# Patient Record
Sex: FEMALE | Race: WHITE | NOT HISPANIC OR LATINO | Employment: UNEMPLOYED | URBAN - METROPOLITAN AREA
[De-identification: names, ages, dates, MRNs, and addresses within clinical notes are randomized per-mention and may not be internally consistent; named-entity substitution may affect disease eponyms.]

---

## 2017-01-14 ENCOUNTER — HOSPITAL ENCOUNTER (EMERGENCY)
Facility: HOSPITAL | Age: 22
Discharge: HOME | End: 2017-01-15
Attending: EMERGENCY MEDICINE
Payer: MEDICAID

## 2017-01-14 VITALS
WEIGHT: 120 LBS | BODY MASS INDEX: 19.29 KG/M2 | HEART RATE: 97 BPM | TEMPERATURE: 98 F | DIASTOLIC BLOOD PRESSURE: 68 MMHG | OXYGEN SATURATION: 97 % | SYSTOLIC BLOOD PRESSURE: 101 MMHG | HEIGHT: 66 IN | RESPIRATION RATE: 16 BRPM

## 2017-01-14 DIAGNOSIS — S91.319A LACERATION OF FOOT: ICD-10-CM

## 2017-01-14 LAB
B-HCG UR QL: NEGATIVE
CTP QC/QA: YES

## 2017-01-14 PROCEDURE — 90715 TDAP VACCINE 7 YRS/> IM: CPT | Performed by: PHYSICIAN ASSISTANT

## 2017-01-14 PROCEDURE — 99285 EMERGENCY DEPT VISIT HI MDM: CPT | Mod: 25

## 2017-01-14 PROCEDURE — 12002 RPR S/N/AX/GEN/TRNK2.6-7.5CM: CPT

## 2017-01-14 PROCEDURE — 25000003 PHARM REV CODE 250: Performed by: PHYSICIAN ASSISTANT

## 2017-01-14 PROCEDURE — 81025 URINE PREGNANCY TEST: CPT | Performed by: PHYSICIAN ASSISTANT

## 2017-01-14 PROCEDURE — 90471 IMMUNIZATION ADMIN: CPT | Performed by: PHYSICIAN ASSISTANT

## 2017-01-14 PROCEDURE — 63600175 PHARM REV CODE 636 W HCPCS: Performed by: PHYSICIAN ASSISTANT

## 2017-01-14 RX ORDER — LIDOCAINE HYDROCHLORIDE 10 MG/ML
10 INJECTION INFILTRATION; PERINEURAL
Status: COMPLETED | OUTPATIENT
Start: 2017-01-14 | End: 2017-01-14

## 2017-01-14 RX ADMIN — LIDOCAINE HYDROCHLORIDE 10 ML: 10 INJECTION, SOLUTION INFILTRATION; PERINEURAL at 11:01

## 2017-01-14 RX ADMIN — CLOSTRIDIUM TETANI TOXOID ANTIGEN (FORMALDEHYDE INACTIVATED), CORYNEBACTERIUM DIPHTHERIAE TOXOID ANTIGEN (FORMALDEHYDE INACTIVATED), BORDETELLA PERTUSSIS TOXOID ANTIGEN (GLUTARALDEHYDE INACTIVATED), BORDETELLA PERTUSSIS FILAMENTOUS HEMAGGLUTININ ANTIGEN (FORMALDEHYDE INACTIVATED), BORDETELLA PERTUSSIS PERTACTIN ANTIGEN, AND BORDETELLA PERTUSSIS FIMBRIAE 2/3 ANTIGEN 0.5 ML: 5; 2; 2.5; 5; 3; 5 INJECTION, SUSPENSION INTRAMUSCULAR at 11:01

## 2017-01-14 NOTE — ED AVS SNAPSHOT
[facility]  [place] [id number]               Vianey Sepulveda   2017 10:40 PM   ED    Description:  Female : 1995   Department:  [facility]           Your Care was Coordinated By:     Provider Role From To    Jaycee Connolly MD Attending Provider [phone] --    MERRY Guerrier Physician Assistant 17 [phone] --      Reason for Visit     Laceration           Diagnoses this Visit        Comments    Laceration of foot           ED Disposition     None           To Do List           Follow-up Information     Please follow up.    Why:  Return to ED in 10 days for suture removal.       These Medications        Disp Refills Start End    doxycycline (VIBRAMYCIN) 100 MG Cap 20 capsule 0 1/15/2017 2017    Take 1 capsule (100 mg total) by mouth 2 (two) times daily. - Oral      [facility]     [facility] On Call Nurse Care Line -  Assistance  Registered nurses in the [facility] provide clinical advisement, health education, appointment booking, and other advisory services.  Call for this free service at [phone].             Medications           START taking these NEW medications        Refills    doxycycline (VIBRAMYCIN) 100 MG Cap 0    Sig: Take 1 capsule (100 mg total) by mouth 2 (two) times daily.    Class: Print    Route: Oral      These medications were administered today        Dose Freq    lidocaine HCL 10 mg/ml (1%) injection 10 mL 10 mL ED 1 Time    Sig: 10 mLs by Infiltration route ED 1 Time.    Class: Normal    Route: Infiltration    Cosign for Ordering: Required by Jaycee Connolly MD    Tdap vaccine injection 0.5 mL 0.5 mL ED 1 Time    Sig: Inject 0.5 mLs into the muscle ED 1 Time.    Class: Normal    Route: Intramuscular    Cosign for Ordering: Required by Jaycee Connolly MD           Verify that the below list of medications is an accurate representation of the medications you are  "currently taking.  If none reported, the list may be blank. If incorrect, please contact your healthcare provider. Carry this list with you in case of emergency.           Current Medications     doxycycline (VIBRAMYCIN) 100 MG Cap Take 1 capsule (100 mg total) by mouth 2 (two) times daily.           Clinical Reference Information           Your Vitals Were     BP Pulse Temp Resp Height Weight    101/68 (BP Location: Right arm, Patient Position: Sitting) 97 98.4 °F (36.9 °C) (Oral) 16 5' 6" (1.676 m) 54.4 kg (120 lb)    SpO2 BMI             97% 19.37 kg/m2         Allergies as of 1/15/2017     No Known Allergies      Immunizations Administered on Date of Encounter - 1/15/2017     None      ED Micro, Lab, POCT     Start Ordered       Status Ordering Provider    01/14/17 2251 01/14/17 2250  POCT urine pregnancy  Once      Final result       ED Imaging Orders     Start Ordered       Status Ordering Provider    01/14/17 2251 01/14/17 2250  X-Ray Foot Complete Left  1 time imaging      Final result         Discharge Instructions         Foot Laceration: All Closures  A laceration is a cut through the skin. You have a cut on your foot. Deep cuts may require stitches (sutures). Minor cuts may be treated with surgical tape closures or skin glue.  X-rays may be done if something may have entered the skin through the cut. Your may also need a tetanus shot. This is given if you are not up to date on this vaccination and the object that caused the cut may lead to tetanus.    Home care  · Your healthcare provider may prescribe an antibiotic. This is to help prevent infection. Follow all instructions for taking this medicine. Take the medicine every day until it is gone or you are told to stop. You should not have any left over.  · The healthcare provider may prescribe medicines for pain. Follow instructions for taking them.  · Follow the healthcare providers instructions on how to care for the cut.  · You may be given " instructions for keeping weight off of the area to allow the injury to heal.   · Follow the healthcare providers instructions on how to care for the cut.   · Keep the wound clean and dry. Do not get the wound wet until you are told it is okay to do so. If the area gets wet, gently pat it dry with a clean cloth. Replace the wet bandage with a dry one.  · To help prevent infection, wash your hands with soap and water before and after caring for the wound.   · Caring for stitches or staples: Once you no longer need to keep them dry, clean the wound daily. First, remove the bandage. Then wash the area gently with soap and warm water, or as directed by the health care provider. Use a wet cotton swab to loosen and remove any blood or crust that forms. After cleaning, apply a thin layer of antibiotic ointment if advised. Then put on a new bandage unless you are told not to.  · Caring for skin glue: Dont put apply liquid, ointment, or cream on the wound while the glue is in place. Avoid activities that cause heavy sweating. Protect the wound from sunlight. Do not scratch, rub, or pick at the adhesive film. Do not place tape directly over the film. The glue should peel off within 5 to 10 days.   · Caring for surgical tape: Keep the area dry. If it gets wet, blot it dry with a clean towel. Surgical tape usually falls off within 7 to 10 days. If it has not fallen off after 10 days, you can take it off yourself. Put mineral oil or petroleum jelly on a cotton ball and gently rub the tape until it is removed.  · Once you can get the wound wet, you may shower as usual but do not soak the wound in water (no tub baths or swimming)  · Even with proper treatment, a wound infection may sometimes occur. Check the wound daily for signs of infection listed below.  Follow-up care  Follow up with your healthcare provider as advised. Return to have stitches or staples removed as directed.  When to seek medical advice  Call your healthcare  provider right away if any of these occur:  · Wound bleeding not controlled by direct pressure  · Signs of infection, including increasing pain in the wound, increasing wound redness or swelling, or pus or bad odor coming from the wound  · Fever of 100.4°F (38.ºC) or as directed by your healthcare provider  · Stitches or staples come apart or fall out or surgical tape falls off before 7 days  · Wound edges re-open  · Wound changes colors  · Numbness or weakness in the affected foot   · Decreased movement of the foot  © 4239-6105 [employer] [address] [place] [address]. All rights reserved. This information is not intended as a substitute for professional medical care. Always follow your healthcare professional's instructions.          [facility] Sign-Up     Activating your [facility] account is as easy as 1-2-3!     1) Visit [email or web address], select Sign Up Now, enter this activation code and your date of birth, then select Next.  [email or web address]  Expires: 3/1/2017 12:07 AM      2) Create a username and password to use when you visit [facility] in the future and select a security question in case you lose your password and select Next.    3) Enter your e-mail address and click Sign Up!    Additional Information  If you have questions, please e-mail [email or web address] or call [phone] to talk to our [facility] staff. Remember, [facility] is NOT to be used for urgent needs. For medical emergencies, dial 911.          [facility] complies with applicable Federal civil rights laws and does not discriminate on the basis of race, color, national origin, age, disability, or sex.        Language Assistance Services     ATTENTION: Language assistance services are available, free of charge. Please call [phone].      ATENCIÓN: Si radhala sanam, tiene a ness disposición servicios gratuitos de asistencia lingüística. Llame al [phone].     CHÚ Ý: N?u b?n nói Ti?ng Vi?t,  có các d?ch v? h? tr? dora ng? mi?n phí dành cho b?n. G?i s? [phone].

## 2017-01-15 RX ORDER — DOXYCYCLINE 100 MG/1
100 CAPSULE ORAL 2 TIMES DAILY
Qty: 20 CAPSULE | Refills: 0 | Status: SHIPPED | OUTPATIENT
Start: 2017-01-15 | End: 2017-01-25

## 2017-01-15 NOTE — ED TRIAGE NOTES
Pt presents to ed with complaints of two lacerations to rt foot. 3cm to big toe and 3 to side of left foot. Bleeding controlled.

## 2017-01-15 NOTE — DISCHARGE INSTRUCTIONS
Foot Laceration: All Closures  A laceration is a cut through the skin. You have a cut on your foot. Deep cuts may require stitches (sutures). Minor cuts may be treated with surgical tape closures or skin glue.  X-rays may be done if something may have entered the skin through the cut. Your may also need a tetanus shot. This is given if you are not up to date on this vaccination and the object that caused the cut may lead to tetanus.    Home care  · Your healthcare provider may prescribe an antibiotic. This is to help prevent infection. Follow all instructions for taking this medicine. Take the medicine every day until it is gone or you are told to stop. You should not have any left over.  · The healthcare provider may prescribe medicines for pain. Follow instructions for taking them.  · Follow the healthcare providers instructions on how to care for the cut.  · You may be given instructions for keeping weight off of the area to allow the injury to heal.   · Follow the healthcare providers instructions on how to care for the cut.   · Keep the wound clean and dry. Do not get the wound wet until you are told it is okay to do so. If the area gets wet, gently pat it dry with a clean cloth. Replace the wet bandage with a dry one.  · To help prevent infection, wash your hands with soap and water before and after caring for the wound.   · Caring for stitches or staples: Once you no longer need to keep them dry, clean the wound daily. First, remove the bandage. Then wash the area gently with soap and warm water, or as directed by the health care provider. Use a wet cotton swab to loosen and remove any blood or crust that forms. After cleaning, apply a thin layer of antibiotic ointment if advised. Then put on a new bandage unless you are told not to.  · Caring for skin glue: Dont put apply liquid, ointment, or cream on the wound while the glue is in place. Avoid activities that cause heavy sweating. Protect the wound  from sunlight. Do not scratch, rub, or pick at the adhesive film. Do not place tape directly over the film. The glue should peel off within 5 to 10 days.   · Caring for surgical tape: Keep the area dry. If it gets wet, blot it dry with a clean towel. Surgical tape usually falls off within 7 to 10 days. If it has not fallen off after 10 days, you can take it off yourself. Put mineral oil or petroleum jelly on a cotton ball and gently rub the tape until it is removed.  · Once you can get the wound wet, you may shower as usual but do not soak the wound in water (no tub baths or swimming)  · Even with proper treatment, a wound infection may sometimes occur. Check the wound daily for signs of infection listed below.  Follow-up care  Follow up with your healthcare provider as advised. Return to have stitches or staples removed as directed.  When to seek medical advice  Call your healthcare provider right away if any of these occur:  · Wound bleeding not controlled by direct pressure  · Signs of infection, including increasing pain in the wound, increasing wound redness or swelling, or pus or bad odor coming from the wound  · Fever of 100.4°F (38.ºC) or as directed by your healthcare provider  · Stitches or staples come apart or fall out or surgical tape falls off before 7 days  · Wound edges re-open  · Wound changes colors  · Numbness or weakness in the affected foot   · Decreased movement of the foot  © 4363-1837 [employer] [address] [place] [address], [place] [address]. All rights reserved. This information is not intended as a substitute for professional medical care. Always follow your healthcare professional's instructions.

## 2017-01-15 NOTE — ED PROVIDER NOTES
Encounter Date: 1/14/2017    SCRIBE #1 NOTE: I, Jacquelyn Caldwell, am scribing for, and in the presence of,  Thuy Schaeffer PA-C. I have scribed the following portions of the note - Other sections scribed: HPI/ROS.       History     Chief Complaint   Patient presents with    Laceration     Pt states she went swimming today and cut her left foot on a oyster shell.     Review of patient's allergies indicates:  No Known Allergies  HPI Comments: CC: Laceration    HPI: This 20 yo F who has no pertinent PMHx presents to the ED for evaluation of laceration to the posterior left greater toe and left lateral foot with associated left foot pain secondary to cutting her foot on an oyster shell today. The pain is moderate and constant. The [place] Tetanus is not up-to-date. The pt denies fever, chills, N/V/D, and any other associated symptoms. No alleviating or exacerbating factors reported.     The history is provided by the patient. No  was used.     History reviewed. No pertinent past medical history.  No past medical history pertinent negatives.  History reviewed. No pertinent past surgical history.  History reviewed. No pertinent family history.  Social History   Substance Use Topics    Smoking status: Never Smoker    Smokeless tobacco: None    Alcohol use No     Review of Systems   Constitutional: Negative for chills and fever.   HENT: Negative for congestion and sore throat.    Eyes: Negative for visual disturbance.   Respiratory: Negative for cough and shortness of breath.    Gastrointestinal: Negative for abdominal pain, diarrhea, nausea and vomiting.   Genitourinary: Negative for dysuria and frequency.   Musculoskeletal: Negative for myalgias.        (+) L foot pain   Skin: Positive for wound.        (+) laceration, to the posterior L greater toe and L lateral foot   Neurological: Negative for headaches.       Physical Exam   Initial Vitals   BP Pulse  Resp Temp SpO2   01/14/17 2238 01/14/17 2238 01/14/17 2238 01/14/17 2238 01/14/17 2238   101/68 97 16 98.4 °F (36.9 °C) 97 %     Physical Exam    Constitutional: She appears well-nourished.   Eyes: Conjunctivae and EOM are normal. Pupils are equal, round, and reactive to light.   Neck: Normal range of motion. Neck supple.   Cardiovascular: Normal rate, regular rhythm, normal heart sounds and intact distal pulses.   Pulmonary/Chest: Breath sounds normal. No respiratory distress.   Musculoskeletal: Normal range of motion.   Neurological: She is alert and oriented to person, place, and time. She has normal strength.   Skin: Skin is warm.   3 cm laceration to plantar aspect of left great toe.  Patient also has a 2 cm well approximated laceration to the lateral aspect of left foot   Psychiatric: She has a normal mood and affect.         ED Course   Lac Repair  Date/Time: 1/15/2017 1:04 AM  Performed by: PANDA MENDES  Authorized by: VITA JENKINS   Location: Plantar aspect of the left great toe.  Laceration length: 3 cm  Foreign bodies: no foreign bodies  Tendon involvement: none  Nerve involvement: none  Vascular damage: no  Anesthesia: local infiltration    Anesthesia:  Anesthesia: local infiltration  Anesthetic total: 4 mL  Patient sedated: no  Preparation: Patient was prepped and draped in the usual sterile fashion.  Irrigation solution: saline  Amount of cleaning: standard  Debridement: none  Skin closure: Ethilon  Number of sutures: 7  Technique: simple  Approximation: close  Approximation difficulty: simple  Dressing: 4x4 sterile gauze  Patient tolerance: Patient tolerated the procedure well with no immediate complications        Labs Reviewed   POCT URINE PREGNANCY             Medical Decision Making:   Initial Assessment:   21-year-old female presents complaining of a laceration to bottom of left foot today.  Patient states she cut it on oyster shells.  Patient has a 3 cm laceration to plantar aspect of  left great toe.  Wound repaired with sutures.  Patient also has a 2 cm well approximated laceration to the lateral aspect of left foot, wound does not require sutures.  Left foot x-rays negative for fracture or foreign body.  Tetanus given in the ED.  Patient instructed to keep wound clean and dry.  I will discharge patient home with doxycycline.  Patient instructed to return to ED if she develops any signs of infection.  Patient is stable for discharge.            Scribe Attestation:   Scribe #1: I performed the above scribed service and the documentation accurately describes the services I performed. I attest to the accuracy of the note.    Attending Attestation:           Physician Attestation for Scribe:  Physician Attestation Statement for Scribe #1: I, Mariella Schaeffer PA-C, reviewed documentation, as scribed by Jacquelyn Caldwell in my presence, and it is both accurate and complete.                 ED Course     Clinical Impression:   The encounter diagnosis was Laceration of foot.          MERRY Guerrier  01/15/17 0106

## 2017-04-28 ENCOUNTER — HOSPITAL ENCOUNTER (EMERGENCY)
Facility: HOSPITAL | Age: 22
Discharge: HOME | End: 2017-04-28
Attending: EMERGENCY MEDICINE
Payer: MEDICAID

## 2017-04-28 VITALS
SYSTOLIC BLOOD PRESSURE: 114 MMHG | BODY MASS INDEX: 20.25 KG/M2 | TEMPERATURE: 98 F | RESPIRATION RATE: 18 BRPM | WEIGHT: 126 LBS | DIASTOLIC BLOOD PRESSURE: 65 MMHG | OXYGEN SATURATION: 98 % | HEART RATE: 97 BPM | HEIGHT: 66 IN

## 2017-04-28 DIAGNOSIS — Z32.02 PREGNANCY TEST NEGATIVE: Primary | ICD-10-CM

## 2017-04-28 LAB
B-HCG UR QL: NEGATIVE
CTP QC/QA: YES

## 2017-04-28 PROCEDURE — 99283 EMERGENCY DEPT VISIT LOW MDM: CPT

## 2017-04-28 PROCEDURE — 81025 URINE PREGNANCY TEST: CPT | Performed by: EMERGENCY MEDICINE

## 2017-04-28 NOTE — ED PROVIDER NOTES
"Encounter Date: 4/28/2017    SCRIBE #1 NOTE: I, Orquidealesly Monzon, am scribing for, and in the presence of,  Kyra Chavez NP. I have scribed the following portions of the note - Other sections scribed: HPI and ROS.       History     Chief Complaint   Patient presents with    Possible Pregnancy     States she needs to get tested to see if she is pregnant     Review of patient's allergies indicates:  No Known Allergies  HPI Comments: CC: Possible Pregnancy    HPI: This 21 y.o. Female, accompanied by significant other, with no medical history presents to the ED for evaluation of a possible pregnancy. Per significant other, pt recently took multiple urine pregnancy test all with negative results. He states that pt also visited a "pregnancy testing clinic" where she was informed that she needed to be evaluated by her PCP or in a hospital to have a blood test preformed. Pt states that she experienced her last menstrual cycle on 3/26/17, and adds that she experienced vaginal spotting 3x weeks ago (lasted 3x days). Pt notes that she would like to have a pregnancy test preformed in the ED. No other associated symptoms.         The history is provided by the patient and a significant other. No  was used.     History reviewed. No pertinent past medical history.  History reviewed. No pertinent surgical history.  Family History   Problem Relation Age of Onset    Breast cancer Neg Hx     Colon cancer Neg Hx     Ovarian cancer Neg Hx      Social History   Substance Use Topics    Smoking status: Current Every Day Smoker     Types: Cigarettes    Smokeless tobacco: None    Alcohol use Yes      Comment: rarely     Review of Systems   Constitutional: Negative for fever.        (+) possible pregnancy   HENT: Negative for sore throat.    Respiratory: Negative for shortness of breath.    Cardiovascular: Negative for chest pain.   Gastrointestinal: Negative for nausea.   Genitourinary: Negative for dysuria. "   Musculoskeletal: Negative for back pain.   Skin: Negative for rash.   Neurological: Negative for weakness.       Physical Exam   Initial Vitals   BP Pulse Resp Temp SpO2   04/28/17 1551 04/28/17 1551 04/28/17 1551 04/28/17 1551 04/28/17 1551   114/65 97 18 98.3 °F (36.8 °C) 98 %     Physical Exam    Nursing note and vitals reviewed.  Constitutional: She appears well-developed and well-nourished.   HENT:   Head: Normocephalic.   Eyes: Conjunctivae are normal.   Neck: Normal range of motion. Neck supple.   Musculoskeletal: Normal range of motion.   Neurological: She is alert and oriented to person, place, and time.   Skin: Skin is warm and dry.         ED Course   Procedures  Labs Reviewed   POCT URINE PREGNANCY             Medical Decision Making:   Initial Assessment:   21-year-old female presents for pregnancy test.  Differential Diagnosis:   Fear complaint  Positive pregnancy test  Negative pregnancy test  ED Management:  Patient and significant other presents requesting a serum hCG test being she has taken multiple pregnancy tests at home and they are negative.  Her pregnancy test was negative upon presentation to the ER. Patient is having no abdominal pain, no vaginal discharge, no other concerns.  At this time I do not believe patient is pregnant.  She has been encouraged to repeat pregnancy test at another 2-5 days if she is still concerned.  Patient reports that her significant other are attempting to become pregnant and that they were very hopeful it would be positive.    Case discussed with attending who agrees with assessment and plan.               Scribe Attestation:   Scribe #1: I performed the above scribed service and the documentation accurately describes the services I performed. I attest to the accuracy of the note.    Attending Attestation:     Physician Attestation Statement for NP/PA:   I discussed this assessment and plan of this patient with the NP/PA, but I did not personally examine the  patient. The face to face encounter was performed by the NP/PA.    Other NP/PA Attestation Additions:      Medical Decision Making: Agree with assessment and plan.       Physician Attestation for Scribe:  Physician Attestation Statement for Scribe #1: I, Kyra Chavez NP, reviewed documentation, as scribed by Orquidea Monzon in my presence, and it is both accurate and complete.                 ED Course     Clinical Impression:   The encounter diagnosis was Pregnancy test negative.    Disposition:   Disposition: Discharged  Condition: Stable       Kyra Chavez NP  04/28/17 8220       Marek Galeana MD  04/28/17 5904

## 2017-04-28 NOTE — ED TRIAGE NOTES
Reports home pregnancy test  Was negative. Requesting serum hcg. C/o nausea, HA, breast changes, increased frequency in urination, 10lb wt. Gain in 3 weeks.

## 2017-04-28 NOTE — ED AVS SNAPSHOT
[facility]  [address] [place] [address]               Vianey Sepulveda   2017  4:15 PM   ED    Description:  Female : 1995   Department:  [facility]           Your Care was Coordinated By:     Provider Role From To    Marek Galeana MD Attending Provider 17 [id number] --    Kyra Chavez NP Nurse Practitioner 17 [id number] --      Reason for Visit     Possible Pregnancy           Diagnoses this Visit        Comments    Pregnancy test negative    -  Primary       ED Disposition     None           To Do List           Follow-up Information     Schedule an appointment as soon as possible for a visit with Elenita Olvera MD.    Specialty:  Family Medicine    Contact information:    [address]  [place] [id number] [phone]          Follow up with [facility].    Specialty:  Emergency Medicine    Why:  If symptoms worsen or any other concerns    Contact information:    [address] [place] [id number] [phone]      [facility]     [facility] Nurse Care Line -  Assistance  Unless otherwise directed by your provider, please contact [facility], our nurse care line that is available for  assistance.     Registered nurses in the [facility] provide: appointment scheduling, clinical advisement, health education, and other advisory services.  Call: [phone] (toll free)               Medications           Message regarding Medications     Verify the changes and/or additions to your medication regime listed below are the same as discussed with your clinician today.  If any of these changes or additions are incorrect, please notify your healthcare provider.             Verify that the below list of medications is an accurate representation of the medications you are currently taking.  If none reported, the list may be blank. If incorrect, please contact  "your healthcare provider. Carry this list with you in case of emergency.           Current Medications     norgestimate-ethinyl estradiol (ORTHO-CYCLEN) 0.25-35 mg-mcg per tablet Take 1 tablet by mouth once daily.           Clinical Reference Information           Your Vitals Were     BP Pulse Temp Resp Height Weight    114/65 97 98.3 °F (36.8 °C) 18 5' 6" (1.676 m) 57.2 kg (126 lb)    Last Period SpO2 BMI          03/26/2017 98% 20.34 kg/m2        Allergies as of 4/28/2017     No Known Allergies      Immunizations Administered on Date of Encounter - 4/28/2017     None      ED Micro, Lab, POCT     Start Ordered       Status Ordering Provider    04/28/17 1553 04/28/17 1553  POCT urine pregnancy  Once      Final result       ED Imaging Orders     None      Discharge References/Attachments     HCG (URINE) (ENGLISH)      [facility] Sign-Up     Activating your [facility] account is as easy as 1-2-3!     1) Visit [email or web address], select Sign Up Now, enter this activation code and your date of birth, then select Next.  [email or web address]  Expires: 6/12/2017  4:36 PM      2) Create a username and password to use when you visit [facility] in the future and select a security question in case you lose your password and select Next.    3) Enter your e-mail address and click Sign Up!    Additional Information  If you have questions, please e-mail [email or web address] or call [phone] to talk to our [facility] staff. Remember, [facility] is NOT to be used for urgent needs. For medical emergencies, dial 911.         Smoking Cessation     If you would like to quit smoking:   You may be eligible for free services if you are a [place] resident and started smoking cigarettes before September 1, 1988.  Call the Smoking Cessation Trust (SCT) toll free at (451) 448-9581 or (363) 979-0933.   Call 6-800-QUIT-NOW if you do not meet the above criteria.   Contact us via email: [email or web address]   View our website for more " information: [email or web address]         [facility] complies with applicable Federal civil rights laws and does not discriminate on the basis of race, color, national origin, age, disability, or sex.        Language Assistance Services     ATTENTION: Language assistance services are available, free of charge. Please call [phone].      ATENCIÓN: Si habla español, tiene a ness disposición servicios gratuitos de asistencia lingüística. Llame al [phone].     CHÚ Ý: N?u b?n nói Ti?ng Vi?t, có các d?ch v? h? tr? ngôn ng? mi?n phí dành cho b?n. G?i s? [phone].

## 2022-10-31 ENCOUNTER — HOSPITAL ENCOUNTER (EMERGENCY)
Facility: HOSPITAL | Age: 27
Discharge: HOME | End: 2022-10-31
Attending: STUDENT IN AN ORGANIZED HEALTH CARE EDUCATION/TRAINING PROGRAM
Payer: MEDICAID

## 2022-10-31 VITALS
WEIGHT: 140 LBS | HEART RATE: 96 BPM | DIASTOLIC BLOOD PRESSURE: 86 MMHG | TEMPERATURE: 98 F | BODY MASS INDEX: 21.97 KG/M2 | SYSTOLIC BLOOD PRESSURE: 117 MMHG | HEIGHT: 67 IN | RESPIRATION RATE: 20 BRPM | OXYGEN SATURATION: 96 %

## 2022-10-31 DIAGNOSIS — T14.90XA INJURY: ICD-10-CM

## 2022-10-31 DIAGNOSIS — S92.335A CLOSED NONDISPLACED FRACTURE OF THIRD METATARSAL BONE OF LEFT FOOT, INITIAL ENCOUNTER: ICD-10-CM

## 2022-10-31 DIAGNOSIS — S99.922A FOOT INJURY, LEFT, INITIAL ENCOUNTER: Primary | ICD-10-CM

## 2022-10-31 LAB
B-HCG UR QL: NEGATIVE
CTP QC/QA: YES

## 2022-10-31 PROCEDURE — 25000003 PHARM REV CODE 250: Performed by: NURSE PRACTITIONER

## 2022-10-31 PROCEDURE — 99284 EMERGENCY DEPT VISIT MOD MDM: CPT

## 2022-10-31 PROCEDURE — 81025 URINE PREGNANCY TEST: CPT | Performed by: STUDENT IN AN ORGANIZED HEALTH CARE EDUCATION/TRAINING PROGRAM

## 2022-10-31 RX ORDER — DEXTROMETHORPHAN HYDROBROMIDE, GUAIFENESIN 5; 100 MG/5ML; MG/5ML
650 LIQUID ORAL EVERY 8 HOURS
Qty: 20 TABLET | Refills: 0 | Status: SHIPPED | OUTPATIENT
Start: 2022-10-31 | End: 2022-11-05

## 2022-10-31 RX ORDER — IBUPROFEN 600 MG/1
600 TABLET ORAL
Status: COMPLETED | OUTPATIENT
Start: 2022-10-31 | End: 2022-10-31

## 2022-10-31 RX ORDER — IBUPROFEN 600 MG/1
600 TABLET ORAL EVERY 6 HOURS PRN
Qty: 20 TABLET | Refills: 0 | Status: SHIPPED | OUTPATIENT
Start: 2022-10-31 | End: 2022-11-05

## 2022-10-31 RX ORDER — METHOCARBAMOL 500 MG/1
1000 TABLET, FILM COATED ORAL 3 TIMES DAILY
Qty: 30 TABLET | Refills: 0 | Status: SHIPPED | OUTPATIENT
Start: 2022-10-31 | End: 2022-11-05

## 2022-10-31 RX ADMIN — IBUPROFEN 600 MG: 600 TABLET ORAL at 01:10

## 2022-10-31 NOTE — Clinical Note
"Vianey Arredondo" Coco was seen and treated in our emergency department on 10/31/2022.  She may return to work on 11/01/2022.       If you have any questions or concerns, please don't hesitate to call.      GLORIA Whatley"

## 2022-10-31 NOTE — ED PROVIDER NOTES
Encounter Date: 10/31/2022       History     Chief Complaint   Patient presents with    Foot Pain     Presents with complaint of left foot pain, bruising and swelling since she injured it one month ago.  De does not recall the details of the injury     27 y/o female presents to the ED with complaints of left foot injury with pain for 1 month.  Patient states she is unsure of an exact injury but has been having pain for a month that is not improving.  Patient denies rash, fever, chest pain, SOB, numbness, weakness, tingling, abdominal pain, back pain, dysuria, hematuria, nausea, vomiting, diarrhea, or any other complaints.  Patient rates the pain as 10/10 and has not taken any medications for the symptoms.  No Alleviating/aggravating factors.              The history is provided by the patient.   Review of patient's allergies indicates:  No Known Allergies  No past medical history on file.  No past surgical history on file.  Family History   Problem Relation Age of Onset    Breast cancer Neg Hx     Colon cancer Neg Hx     Ovarian cancer Neg Hx      Social History     Tobacco Use    Smoking status: Every Day     Types: Cigarettes   Substance Use Topics    Alcohol use: Yes     Comment: rarely     Review of Systems   Constitutional:  Negative for chills and fever.   HENT:  Negative for congestion, ear pain, rhinorrhea, sore throat and trouble swallowing.    Eyes:  Negative for pain, discharge and redness.   Respiratory:  Negative for cough and shortness of breath.    Cardiovascular:  Negative for chest pain.   Gastrointestinal:  Negative for abdominal pain, diarrhea, nausea and vomiting.   Genitourinary:  Negative for decreased urine volume and dysuria.   Musculoskeletal:  Positive for arthralgias. Negative for back pain, neck pain and neck stiffness.   Skin:  Negative for rash.   Neurological:  Negative for dizziness, weakness, light-headedness, numbness and headaches.   Psychiatric/Behavioral:  Negative for confusion.       Physical Exam     Initial Vitals [10/31/22 0103]   BP Pulse Resp Temp SpO2   117/65 97 19 -- 98 %      MAP       --         Physical Exam    Nursing note and vitals reviewed.  Constitutional: Vital signs are normal. She appears well-developed.  Non-toxic appearance. She does not appear ill.   HENT:   Head: Normocephalic and atraumatic.   Right Ear: External ear normal.   Left Ear: External ear normal.   Eyes: Conjunctivae are normal.   Neck:   Normal range of motion.  Cardiovascular:  Normal rate and regular rhythm.           Pulmonary/Chest: Effort normal and breath sounds normal. She exhibits no tenderness.   Abdominal: Abdomen is soft. There is no abdominal tenderness.   Musculoskeletal:      Cervical back: Normal range of motion.      Left foot: Normal range of motion and normal capillary refill. Tenderness present. No swelling. Normal pulse.      Comments: Tenderness at the base of the 2nd and 3rd left metatarsals with normal ROM, strength, and sensation; no erythema, bruising, rash, or obvious deformity; bunion noted to left great toe; +2 DP/PT pulse; capillary refill < 2 seconds; skin intact     Neurological: She is alert and oriented to person, place, and time. Gait normal. GCS eye subscore is 4. GCS verbal subscore is 5. GCS motor subscore is 6.   Skin: Skin is warm, dry and intact. No rash noted.   Psychiatric: She has a normal mood and affect. Her speech is normal and behavior is normal. Judgment and thought content normal.       ED Course   Orthopedic Injury    Date/Time: 10/31/2022 2:19 AM  Performed by: GLORIA Whatley  Authorized by: Marquis Ely MD     Location procedure was performed:  [facility] EMERGENCY DEPARTMENT  Injury:     Injury location:  Foot    Location details:  Left foot    Injury type:  Fracture    Fracture type: third metatarsal        Pre-procedure assessment:     Neurovascular status: Neurovascularly intact      Distal perfusion: normal      Neurological function: normal       Range of motion: normal        Selections made in this section will also lock the Injury type section above.:     Immobilization:  Brace (walking boot)    Complications: No      Specimens: No      Implants: No    Post-procedure assessment:     Neurovascular status: Neurovascularly intact      Distal perfusion: normal      Neurological function: normal      Range of motion: splinted      Patient tolerance:  Patient tolerated the procedure well with no immediate complications  Labs Reviewed   POCT URINE PREGNANCY          Imaging Results              X-Ray Foot Complete Left (Final result)  Result time 10/31/22 02:04:44      Final result by Vin Anderson MD (10/31/22 02:04:44)                   Impression:      No definite radiographic evidence of acute displaced fracture or dislocation of the left foot.    Remote healed deformity involving the distal diaphysis of the 3rd metatarsal, possibly relating to prior stress fracture.  Clinical correlation advised.      Electronically signed by: Vin Anderson MD  Date:    10/31/2022  Time:    02:04               Narrative:    EXAMINATION:  XR FOOT COMPLETE 3 VIEW LEFT    CLINICAL HISTORY:  .  Injury, unspecified, initial encounter    TECHNIQUE:  AP, lateral and oblique views of the left foot were performed.    COMPARISON:  01/14/2017    FINDINGS:  There is a remote healed deformity involving the distal diaphysis of the 3rd metatarsal, noting somewhat prominent external callus formation.  No definite acute displaced fracture identified on today's examination.  No abnormal widening of the Lisfranc interval appreciated.  Joint spaces appear appropriately maintained.  No definite retained radiopaque foreign body appreciated within the visualized soft tissues.                                       Medications   ibuprofen tablet 600 mg (600 mg Oral Given 10/31/22 0129)           APC / Resident Notes:   This is an urgent evaluation of a 26 y.o. female that presents to the  Emergency Department for left foot injury.  The patient is a non-toxic, afebrile, and well appearing female. On physical exam, there is Tenderness at the base of the 2nd and 3rd left metatarsals with normal ROM, strength, and sensation; no erythema, bruising, rash, or obvious deformity; bunion noted to left great toe; +2 DP/PT pulse; capillary refill < 2 seconds; skin intact.     Vital Signs: 117/65, 97, 19, 98%  EKG: not indicated  Lab\Radiology\Other Procedure RESULTS: Xray showed remote healed deformity involving the distal diaphysis of the 3rd metatarsal, noting somewhat prominent external callus formation    Given the above findings, my overall impression is Healed fracture 3rd left metatarsal. Given the above findings, I do not think the patient has acute fracture, dislocation, cellulitis, laceration.    During her stay in the ED, the patient has been given Ibuprofen with good relief of her symptoms. The patient will be discharged home with Ibuprofen, tylenol, Robaxin, walking boot. Additional home care recommendations include return precautions, RICE, Ortho f/u. The diagnosis, treatment plan, instructions for follow-up and reevaluation with her Ortho as well as ED return precautions have been discussed with the patient and she has verbalized an understanding of the information. All questions or concerns from the patient have been addressed.                        Clinical Impression:   Final diagnoses:  [T14.90XA] Injury  [S99.922A] Foot injury, left, initial encounter (Primary)  [S92.335A] Closed nondisplaced fracture of third metatarsal bone of left foot, initial encounter - remote, healed        ED Disposition Condition    Discharge Stable          ED Prescriptions       Medication Sig Dispense Start Date End Date Auth. Provider    acetaminophen (TYLENOL) 650 MG TbSR Take 1 tablet (650 mg total) by mouth every 8 (eight) hours. for 5 days 20 tablet 10/31/2022 11/5/2022 GLORIA Whatley    ibuprofen  (ADVIL,MOTRIN) 600 MG tablet Take 1 tablet (600 mg total) by mouth every 6 (six) hours as needed for Pain. 20 tablet 10/31/2022 11/5/2022 GLORIA Whatley    methocarbamoL (ROBAXIN) 500 MG Tab Take 2 tablets (1,000 mg total) by mouth 3 (three) times daily. for 5 days 30 tablet 10/31/2022 11/5/2022 GLORIA Whatley          Follow-up Information       Follow up With Specialties Details Why Contact Info    Maria C Wheat DPM Podiatry Schedule an appointment as soon as possible for a visit in 2 days  [address] [facility] [address]  [phone]      [facility] - Emergency Dept Emergency Medicine Go to  If symptoms worsen [address]  [facility] [address] [phone]             GLORIA Whatley  10/31/22 0215     DISPLAY PLAN FREE TEXT DISPLAY PLAN FREE TEXT

## 2022-10-31 NOTE — ED TRIAGE NOTES
Pt reports L foot pain (specifically just below 4th and 5th digit) x1 month. Pt cant recall what caused pain but reports having difficulty bearing weight on foot and pain has gotten increasingly worse overtime.

## 2023-04-29 ENCOUNTER — HOSPITAL ENCOUNTER (EMERGENCY)
Facility: HOSPITAL | Age: 28
Discharge: HOME | End: 2023-04-30
Attending: EMERGENCY MEDICINE
Payer: MEDICAID

## 2023-04-29 VITALS
HEART RATE: 99 BPM | DIASTOLIC BLOOD PRESSURE: 82 MMHG | BODY MASS INDEX: 21.97 KG/M2 | HEIGHT: 67 IN | RESPIRATION RATE: 18 BRPM | OXYGEN SATURATION: 100 % | WEIGHT: 140 LBS | SYSTOLIC BLOOD PRESSURE: 121 MMHG | TEMPERATURE: 97 F

## 2023-04-29 DIAGNOSIS — S92.322A DISPLACED FRACTURE OF SECOND METATARSAL BONE, LEFT FOOT, INITIAL ENCOUNTER FOR CLOSED FRACTURE: ICD-10-CM

## 2023-04-29 DIAGNOSIS — S92.351A DISPLACED FRACTURE OF FIFTH METATARSAL BONE, RIGHT FOOT, INITIAL ENCOUNTER FOR CLOSED FRACTURE: Primary | ICD-10-CM

## 2023-04-29 PROCEDURE — 25000003 PHARM REV CODE 250: Performed by: PHYSICIAN ASSISTANT

## 2023-04-29 PROCEDURE — 99283 EMERGENCY DEPT VISIT LOW MDM: CPT

## 2023-04-29 RX ORDER — ONDANSETRON 4 MG/1
4 TABLET, ORALLY DISINTEGRATING ORAL
Status: COMPLETED | OUTPATIENT
Start: 2023-04-29 | End: 2023-04-29

## 2023-04-29 RX ORDER — HYDROCODONE BITARTRATE AND ACETAMINOPHEN 5; 325 MG/1; MG/1
1 TABLET ORAL
Status: COMPLETED | OUTPATIENT
Start: 2023-04-29 | End: 2023-04-29

## 2023-04-29 RX ADMIN — HYDROCODONE BITARTRATE AND ACETAMINOPHEN 1 TABLET: 5; 325 TABLET ORAL at 11:04

## 2023-04-29 RX ADMIN — ONDANSETRON 4 MG: 4 TABLET, ORALLY DISINTEGRATING ORAL at 11:04

## 2023-04-29 NOTE — Clinical Note
"Vianey Arredondo" Coco was seen and treated in our emergency department on 4/29/2023.  She may return to work after being cleared by follow-up physician 05/04/2023.       If you have any questions or concerns, please don't hesitate to call.      Eboni Baker PA-C"

## 2023-04-30 RX ORDER — OXYCODONE HYDROCHLORIDE 5 MG/1
5 TABLET ORAL EVERY 4 HOURS PRN
Qty: 18 TABLET | Refills: 0 | Status: SHIPPED | OUTPATIENT
Start: 2023-04-30 | End: 2023-04-30 | Stop reason: CLARIF

## 2023-04-30 RX ORDER — ACETAMINOPHEN 500 MG
500 TABLET ORAL EVERY 4 HOURS PRN
Qty: 20 TABLET | Refills: 0 | Status: SHIPPED | OUTPATIENT
Start: 2023-04-30 | End: 2023-04-30 | Stop reason: CLARIF

## 2023-04-30 RX ORDER — IBUPROFEN 600 MG/1
600 TABLET ORAL EVERY 6 HOURS PRN
Qty: 20 TABLET | Refills: 0 | Status: SHIPPED | OUTPATIENT
Start: 2023-04-30 | End: 2023-04-30 | Stop reason: CLARIF

## 2023-04-30 NOTE — ED PROVIDER NOTES
Encounter Date: 4/29/2023       History     Chief Complaint   Patient presents with    Foot Pain     Bilateral foot pain since last night. States tripped over a wired basket. Denies LOC. + swelling to both feet     CC: Foot Pain    HPI:   28 y/o F with no pertinent history presenting for evaluation of 8/10 bilateral foot pain and swelling since yesterday evening.  She reports she was walking barefooted in her garage when she neck pain, back pain, weakness, paresthesias.  She states she is been unable to ambulate today but was able to come into the ED with her mother caring her/assisting her with ambulation.  Attempted tx with ibuprofen and tylenol     Currently on her menstrual cycle. Denies concern for pregnancy           The history is provided by the patient and a parent.   Review of patient's allergies indicates:  No Known Allergies  History reviewed. No pertinent past medical history.  History reviewed. No pertinent surgical history.  Family History   Problem Relation Age of Onset    Breast cancer Neg Hx     Colon cancer Neg Hx     Ovarian cancer Neg Hx      Social History     Tobacco Use    Smoking status: Every Day     Types: Vaping with nicotine   Substance Use Topics    Alcohol use: Yes     Comment: rarely     Review of Systems   Constitutional:  Negative for chills and fever.   HENT:  Negative for congestion, ear pain, nosebleeds, rhinorrhea, sore throat and trouble swallowing.    Eyes:  Negative for redness.   Respiratory:  Negative for cough, shortness of breath and stridor.    Cardiovascular:  Negative for chest pain.   Gastrointestinal:  Negative for abdominal pain, constipation, diarrhea, nausea and vomiting.   Genitourinary:  Negative for decreased urine volume, dysuria, frequency, hematuria and urgency.   Musculoskeletal:  Positive for arthralgias. Negative for back pain and neck pain.   Skin:  Positive for color change. Negative for rash and wound.   Neurological:  Negative for dizziness,  speech difficulty, weakness, light-headedness, numbness and headaches.   Hematological:  Does not bruise/bleed easily.   Psychiatric/Behavioral:  Negative for confusion.      Physical Exam     Initial Vitals [04/29/23 2216]   BP Pulse Resp Temp SpO2   121/82 99 18 97 °F (36.1 °C) 100 %      MAP       --         Physical Exam    Nursing note and vitals reviewed.  Constitutional: She appears well-developed and well-nourished. No distress.   HENT:   Head: Normocephalic.   Right Ear: External ear normal.   Left Ear: External ear normal.   Eyes: Conjunctivae are normal. Right eye exhibits no discharge. Left eye exhibits no discharge. No scleral icterus.   Neck: No tracheal deviation present.   Cardiovascular:  Intact distal pulses.           Pulses:       Dorsalis pedis pulses are 2+ on the right side and 2+ on the left side.   Pulmonary/Chest: No stridor. No respiratory distress.   Musculoskeletal:      Comments: Swelling and bruising over the lateral aspect of the R foot  TTP over the base of the 5th metatarsal and over the 3rd 4th and 5th metatarsals. No ankle ttp or ttp to remainder of the RLE. Normal rom of digits of the R foot     TTP over the 2nd and 3rd metatarsals of the L foot with swelling and bruising. Decreased ROM of digits of L foot due to pain        Neurological: She is alert. No sensory deficit.   Skin: Skin is warm and dry. Capillary refill takes less than 2 seconds. No rash noted. No erythema.   Psychiatric: She has a normal mood and affect. Her behavior is normal. Judgment and thought content normal.       ED Course   Procedures  Labs Reviewed   POCT URINE PREGNANCY          Imaging Results              X-Ray Foot Complete Bilateral (Final result)  Result time 04/29/23 23:46:26   Procedure changed from X-Ray Foot AP Bilateral     Final result by Paulo Martin MD (04/29/23 23:46:26)                   Impression:      Acute metatarsal fractures, as above.      Electronically signed  by: Paulo Martin  Date:    04/29/2023  Time:    23:46               Narrative:    EXAMINATION:  XR FOOT COMPLETE 3 VIEW BILATERAL    CLINICAL HISTORY:  fall;    TECHNIQUE:  AP, lateral, and oblique views of both feet were performed.    COMPARISON:  None    FINDINGS:  Note that the Lisfranc joint is suboptimally evaluated on these nonweightbearing radiographs.    Right: Acute mildly displaced spiral fracture involving the distal half of the 5th metatarsal.  Mild degree of comminution.  No intra-articular extension.    Left: Acute mildly displaced fracture of the 2nd metatarsal distal diaphysis and neck with slight involvement of the head.  Mild degree of comminution.  No intra-articular extension.  Chronic healed fracture of the 3rd metatarsal distal diaphysis.  Mild hallux valgus with bunion.                                       Medications   HYDROcodone-acetaminophen 5-325 mg per tablet 1 tablet (1 tablet Oral Given 4/29/23 2340)   ondansetron disintegrating tablet 4 mg (4 mg Oral Given 4/29/23 2349)     Medical Decision Making:   Clinical Tests:   Lab Tests: Ordered  Radiological Study: Ordered and Reviewed  ED Management:  28 y/o F presenting for bilateral foot pain   Exam above.  No neurovascular deficits.  Independent interpretation of x-rays of the bilateral feet with spiral fracture of the 5th metatarsal of the right foot and  fracture of the 2nd metatarsal of the left foot involving the head and neck.    Discussed patient with Dr. Merino who recommends short walking boot for R foot and regular walking boot for L foot   Unfortunately our ED does not have the short walking boot in stock.   2 regular walking boots were ordered and pt refused walking boot for the R foot stating that she would like to use one she has at home from a previous injury. Discussed risk of weight bearing on the foot and she states she is not going to bear weight on it and her mother insists that she will carry her.   She states  "she will apply walking boot to R foot as soon as she gets home and insists on using her mother as a crutch/being carried by her mother.     UPT was ordered and pt is refusing to give urine and being uncooperative. She said to the nurse "you can't force me to pee."   Discussed that xray imaging may cause harm to fetus if she is pregnant as well as taking ibuprofen which she has been taking at home.   She left AMA prior to giving urine sample. No prescriptions given     Oxycodone was initially prescribed for the pt and when I informed her that I could not fill the prescription if she did not give urine she yelled at the nurse informing her that she did not want the prescription. I called and left a voicemail at her pharmacy to discontinue this as it is after hours.     Dr. Merino states he will see the pt in clinic on Thursday. Referral placed. Instructed pt to follow up w ortho on Thursday  Return to ER for worsening symptoms or as needed.     Discussed with Dr. Briones who agrees with assessment and plan.                         Clinical Impression:   Final diagnoses:  [S92.351A] Displaced fracture of fifth metatarsal bone, right foot, initial encounter for closed fracture (Primary)  [S92.322A] Displaced fracture of second metatarsal bone, left foot, initial encounter for closed fracture        ED Disposition Condition    AMA Stable                Eboni Baker PA-C  04/30/23 0310       Eboni Baker PA-C  05/01/23 0719    "

## 2023-04-30 NOTE — ED NOTES
Pt accepts one boots because she states she has boots like the one provided at home she can wear.  Pt has been informed that both feet broken and she shouldn't put any weight on them.  Pt states she will take one and her mom will carry her to the car then when home she will wear the boots when needed.

## 2023-04-30 NOTE — ED TRIAGE NOTES
Pt present to ED via personal transportation for tripping and falling yesterday night and now swollen feet (top, bottom, and side of feet swollen with greenish discoloration. Pt denies any other symptoms. Pt AAOX4 with mom at bedside.

## 2023-04-30 NOTE — DISCHARGE INSTRUCTIONS

## 2025-07-27 ENCOUNTER — HOSPITAL ENCOUNTER (EMERGENCY)
Facility: HOSPITAL | Age: 30
Discharge: HOME | End: 2025-07-27
Attending: STUDENT IN AN ORGANIZED HEALTH CARE EDUCATION/TRAINING PROGRAM
Payer: COMMERCIAL

## 2025-07-27 VITALS
HEART RATE: 88 BPM | OXYGEN SATURATION: 100 % | RESPIRATION RATE: 20 BRPM | BODY MASS INDEX: 25.71 KG/M2 | SYSTOLIC BLOOD PRESSURE: 132 MMHG | TEMPERATURE: 99 F | HEIGHT: 66 IN | WEIGHT: 160 LBS | DIASTOLIC BLOOD PRESSURE: 82 MMHG

## 2025-07-27 DIAGNOSIS — H60.90 OTITIS EXTERNA, UNSPECIFIED CHRONICITY, UNSPECIFIED LATERALITY, UNSPECIFIED TYPE: Primary | ICD-10-CM

## 2025-07-27 LAB
B-HCG UR QL: NEGATIVE
CTP QC/QA: YES

## 2025-07-27 PROCEDURE — 81025 URINE PREGNANCY TEST: CPT | Performed by: STUDENT IN AN ORGANIZED HEALTH CARE EDUCATION/TRAINING PROGRAM

## 2025-07-27 PROCEDURE — 63600175 PHARM REV CODE 636 W HCPCS: Mod: JZ,TB | Performed by: STUDENT IN AN ORGANIZED HEALTH CARE EDUCATION/TRAINING PROGRAM

## 2025-07-27 PROCEDURE — 96372 THER/PROPH/DIAG INJ SC/IM: CPT | Performed by: STUDENT IN AN ORGANIZED HEALTH CARE EDUCATION/TRAINING PROGRAM

## 2025-07-27 PROCEDURE — 99284 EMERGENCY DEPT VISIT MOD MDM: CPT | Mod: 25

## 2025-07-27 RX ORDER — KETOROLAC TROMETHAMINE 30 MG/ML
15 INJECTION, SOLUTION INTRAMUSCULAR; INTRAVENOUS
Status: COMPLETED | OUTPATIENT
Start: 2025-07-27 | End: 2025-07-27

## 2025-07-27 RX ORDER — CIPROFLOXACIN AND DEXAMETHASONE 3; 1 MG/ML; MG/ML
4 SUSPENSION/ DROPS AURICULAR (OTIC) 2 TIMES DAILY
Qty: 7.5 ML | Refills: 0 | Status: SHIPPED | OUTPATIENT
Start: 2025-07-27 | End: 2025-08-03

## 2025-07-27 RX ADMIN — KETOROLAC TROMETHAMINE 15 MG: 30 INJECTION, SOLUTION INTRAMUSCULAR; INTRAVENOUS at 03:07

## 2025-07-27 NOTE — ED PROVIDER NOTES
Encounter Date: 7/27/2025       History     Chief Complaint   Patient presents with    Otalgia     Pt c/o RIGHT sided inner ear pain x3 days; pt reports hx of previous ear infections in same ear; pt has been using OTC ear ache drops with no relief, no other meds taken; denies fever     HPI    29-year-old female with a history of recurrent ear infections presents to ED for evaluation of right ear pain for the past 3 days with some green drainage from the ear.  And using over-the-counter earache drops without significant relief.  She denies any fevers, chills, nausea, vomiting, headache or neck pain.    Review of patient's allergies indicates:  No Known Allergies  History reviewed. No pertinent past medical history.  History reviewed. No pertinent surgical history.  Family History   Problem Relation Name Age of Onset    Breast cancer Neg Hx      Colon cancer Neg Hx      Ovarian cancer Neg Hx       Social History[1]  Review of Systems   Constitutional:  Negative for fever.   HENT:  Positive for ear discharge and ear pain. Negative for sore throat.    Respiratory:  Negative for shortness of breath.    Cardiovascular:  Negative for chest pain.   Gastrointestinal:  Negative for nausea.   Genitourinary:  Negative for dysuria.   Musculoskeletal:  Negative for back pain and neck pain.   Skin:  Negative for rash.   Neurological:  Negative for weakness and headaches.   Hematological:  Does not bruise/bleed easily.       Physical Exam     Initial Vitals [07/27/25 0316]   BP Pulse Resp Temp SpO2   132/82 88 20 98.5 °F (36.9 °C) 100 %      MAP       --         Physical Exam    Constitutional: Vital signs are normal. She appears well-developed and well-nourished.  Non-toxic appearance. She does not have a sickly appearance. She does not appear ill.   HENT:   Head: Normocephalic and atraumatic.   Right Ear: Tympanic membrane normal.   Left Ear: Tympanic membrane normal. Mouth/Throat: Mucous membranes are normal.   Patient has  swelling in the canal with tenderness on insertion of the otoscope; TM appears unremarkable  She has not no mastoid tenderness, swelling or skin changes   Eyes: EOM are normal.   Neck: Neck supple.   Cardiovascular:  Normal rate and regular rhythm.           Abdominal: Abdomen is soft. Bowel sounds are normal. There is no abdominal tenderness.   Musculoskeletal:      Cervical back: Neck supple.     Neurological: She is alert.   Skin: Skin is warm and dry. No rash noted.   Psychiatric: She has a normal mood and affect.         ED Course   Procedures  Labs Reviewed   POCT URINE PREGNANCY       Result Value    POC Preg Test, Ur Negative       Acceptable Yes            Imaging Results    None          Medications   ketorolac injection 15 mg (15 mg Intramuscular Given 7/27/25 2029)     Medical Decision Making  Amount and/or Complexity of Data Reviewed  Labs: ordered.    Risk  Prescription drug management.    Vitals unremarkable   Patient is well-appearing and in no acute distress   Physical exam consistent with otitis externa  Considered but doubt malignant otitis externa, otitis media, mastoiditis, TMJ, trigeminal neuralgia  Discharged with the Ciprodex   Advised to follow up regular physician for reassessment in 5-7 days   Patient is stable for discharge    I discussed with the patient/family the diagnosis, treatment plan, indications for return to the emergency department, and for expected follow-up. The patient/family verbalized an understanding. The patient/family is asked if there are any questions or concerns. We discuss the case, until all issues are addressed to the patient/familys satisfaction. Patient/family understands and is agreeable to the plan.   Patrick Bo    DISCLAIMER: This note was prepared with [employer] voice recognition transcription software.                                         Clinical Impression:  Final diagnoses:  [H60.90] Otitis externa, unspecified chronicity,  unspecified laterality, unspecified type (Primary)          ED Disposition Condition    Discharge Stable          ED Prescriptions       Medication Sig Dispense Start Date End Date Auth. Provider    ciprofloxacin-dexAMETHasone 0.3-0.1% (CIPRODEX) 0.3-0.1 % DrpS Place 4 drops into both ears 2 (two) times daily. for 7 days 7.5 mL 7/27/2025 8/3/2025 Patrick Bo MD          Follow-up Information    None                [1]   Social History  Tobacco Use    Smoking status: Every Day     Types: Vaping with nicotine   Substance Use Topics    Alcohol use: Yes     Comment: rarely        Patrick Bo MD  07/27/25 1958

## 2025-07-27 NOTE — ED TRIAGE NOTES
Pt arrives to ED via POV with c/o RIGHT sided inner ear pain x3 days; pt reports hx of previous ear infections in same ear; pt has been using OTC ear ache drops with no relief, no other meds taken; denies fever; pt AAOx4

## 2025-07-27 NOTE — Clinical Note
"Vianey Fairbanks (Alice)uman was seen and treated in our emergency department on 7/27/2025.  She may return to work on 07/28/2025.       If you have any questions or concerns, please don't hesitate to call.           "

## 2025-07-27 NOTE — DISCHARGE INSTRUCTIONS
Place 4 drops of Ciprodex in the ear twice a day for 7 days.  Follow up with the regular physician for reassessment if you continue to have ear pain.  You can alternate a 1000 mg of Tylenol 400 mg of Motrin every 4 hours for your pain.  Thank you.